# Patient Record
Sex: FEMALE | Race: WHITE | NOT HISPANIC OR LATINO
[De-identification: names, ages, dates, MRNs, and addresses within clinical notes are randomized per-mention and may not be internally consistent; named-entity substitution may affect disease eponyms.]

---

## 2017-01-25 ENCOUNTER — RESULT REVIEW (OUTPATIENT)
Age: 27
End: 2017-01-25

## 2017-10-04 ENCOUNTER — OUTPATIENT (OUTPATIENT)
Dept: OUTPATIENT SERVICES | Facility: HOSPITAL | Age: 27
LOS: 1 days | End: 2017-10-04
Payer: COMMERCIAL

## 2017-10-04 ENCOUNTER — APPOINTMENT (OUTPATIENT)
Age: 27
End: 2017-10-04
Payer: COMMERCIAL

## 2017-10-04 DIAGNOSIS — Z00.8 ENCOUNTER FOR OTHER GENERAL EXAMINATION: ICD-10-CM

## 2017-10-04 PROCEDURE — 73650 X-RAY EXAM OF HEEL: CPT | Mod: 26,50

## 2017-10-04 PROCEDURE — 73650 X-RAY EXAM OF HEEL: CPT

## 2018-01-25 ENCOUNTER — RESULT REVIEW (OUTPATIENT)
Age: 28
End: 2018-01-25

## 2018-12-08 ENCOUNTER — TRANSCRIPTION ENCOUNTER (OUTPATIENT)
Age: 28
End: 2018-12-08

## 2019-08-11 ENCOUNTER — TRANSCRIPTION ENCOUNTER (OUTPATIENT)
Age: 29
End: 2019-08-11

## 2020-12-14 ENCOUNTER — APPOINTMENT (OUTPATIENT)
Dept: CARDIOLOGY | Facility: CLINIC | Age: 30
End: 2020-12-14
Payer: COMMERCIAL

## 2020-12-14 VITALS — HEART RATE: 98 BPM | SYSTOLIC BLOOD PRESSURE: 152 MMHG | OXYGEN SATURATION: 98 % | DIASTOLIC BLOOD PRESSURE: 96 MMHG

## 2020-12-14 DIAGNOSIS — Z86.39 PERSONAL HISTORY OF OTHER ENDOCRINE, NUTRITIONAL AND METABOLIC DISEASE: ICD-10-CM

## 2020-12-14 DIAGNOSIS — Z87.09 PERSONAL HISTORY OF OTHER DISEASES OF THE RESPIRATORY SYSTEM: ICD-10-CM

## 2020-12-14 DIAGNOSIS — Z87.19 PERSONAL HISTORY OF OTHER DISEASES OF THE DIGESTIVE SYSTEM: ICD-10-CM

## 2020-12-14 PROCEDURE — 99072 ADDL SUPL MATRL&STAF TM PHE: CPT

## 2020-12-14 PROCEDURE — 93000 ELECTROCARDIOGRAM COMPLETE: CPT

## 2020-12-14 PROCEDURE — 99204 OFFICE O/P NEW MOD 45 MIN: CPT

## 2020-12-14 RX ORDER — FEXOFENADINE HYDROCHLORIDE 180 MG/1
180 TABLET, FILM COATED ORAL DAILY
Qty: 1 | Refills: 1 | Status: ACTIVE | COMMUNITY

## 2020-12-14 RX ORDER — NORETHINDRONE ACETATE AND ETHINYL ESTRADIOL AND FERROUS FUMARATE 1.5-30(21)
1.5-3 KIT ORAL DAILY
Refills: 0 | Status: ACTIVE | COMMUNITY

## 2020-12-14 NOTE — HISTORY OF PRESENT ILLNESS
[FreeTextEntry1] : 29 yo female presents concerned about her cardiac status. Pt reports occasional dyspnea with exertion. Pt reports chest discomfort which she self diagnosed as "indigestion" which occurs for days and sometimes is relieved with antacid. Pt has strong family history of CAD, including her 47 yo sister and her 42 yo brother, both with history of MI and PCI.  Her father and Uncles also had PCI

## 2020-12-14 NOTE — PHYSICAL EXAM
[General Appearance - Well Developed] : well developed [Normal Appearance] : normal appearance [Well Groomed] : well groomed [General Appearance - Well Nourished] : well nourished [No Deformities] : no deformities [General Appearance - In No Acute Distress] : no acute distress [Normal Conjunctiva] : the conjunctiva exhibited no abnormalities [Eyelids - No Xanthelasma] : the eyelids demonstrated no xanthelasmas [Normal Oral Mucosa] : normal oral mucosa [No Oral Pallor] : no oral pallor [No Oral Cyanosis] : no oral cyanosis [Normal Jugular Venous A Waves Present] : normal jugular venous A waves present [Normal Jugular Venous V Waves Present] : normal jugular venous V waves present [No Jugular Venous Forman A Waves] : no jugular venous forman A waves [Heart Rate And Rhythm] : heart rate and rhythm were normal [Heart Sounds] : normal S1 and S2 [Murmurs] : no murmurs present [Respiration, Rhythm And Depth] : normal respiratory rhythm and effort [Exaggerated Use Of Accessory Muscles For Inspiration] : no accessory muscle use [Auscultation Breath Sounds / Voice Sounds] : lungs were clear to auscultation bilaterally [Abdomen Soft] : soft [Abdomen Tenderness] : non-tender [Abdomen Mass (___ Cm)] : no abdominal mass palpated [Abnormal Walk] : normal gait [Gait - Sufficient For Exercise Testing] : the gait was sufficient for exercise testing [Nail Clubbing] : no clubbing of the fingernails [Cyanosis, Localized] : no localized cyanosis [Petechial Hemorrhages (___cm)] : no petechial hemorrhages [Skin Color & Pigmentation] : normal skin color and pigmentation [] : no rash [No Venous Stasis] : no venous stasis [Skin Lesions] : no skin lesions [No Skin Ulcers] : no skin ulcer [No Xanthoma] : no  xanthoma was observed [Oriented To Time, Place, And Person] : oriented to person, place, and time [Affect] : the affect was normal [Mood] : the mood was normal [No Anxiety] : not feeling anxious

## 2020-12-14 NOTE — DISCUSSION/SUMMARY
[FreeTextEntry1] : Pt will have an ETT and Echo to understand risk and in preparation for an exercise program. Labs will be ordered.

## 2020-12-28 ENCOUNTER — APPOINTMENT (OUTPATIENT)
Dept: CARDIOLOGY | Facility: CLINIC | Age: 30
End: 2020-12-28
Payer: COMMERCIAL

## 2020-12-28 LAB
25(OH)D3 SERPL-MCNC: 21.1 NG/ML
ALBUMIN SERPL ELPH-MCNC: 4.7 G/DL
ALP BLD-CCNC: 69 U/L
ALT SERPL-CCNC: 29 U/L
ANION GAP SERPL CALC-SCNC: 17 MMOL/L
AST SERPL-CCNC: 20 U/L
BASOPHILS # BLD AUTO: 0.06 K/UL
BASOPHILS NFR BLD AUTO: 0.5 %
BILIRUB SERPL-MCNC: 0.5 MG/DL
BUN SERPL-MCNC: 12 MG/DL
CALCIUM SERPL-MCNC: 9.7 MG/DL
CHLORIDE SERPL-SCNC: 103 MMOL/L
CHOLEST SERPL-MCNC: 226 MG/DL
CO2 SERPL-SCNC: 18 MMOL/L
CREAT SERPL-MCNC: 1.02 MG/DL
EOSINOPHIL # BLD AUTO: 0.5 K/UL
EOSINOPHIL NFR BLD AUTO: 4.5 %
GLUCOSE SERPL-MCNC: 119 MG/DL
HCT VFR BLD CALC: 48.6 %
HDLC SERPL-MCNC: 46 MG/DL
HGB BLD-MCNC: 14.5 G/DL
IMM GRANULOCYTES NFR BLD AUTO: 0.5 %
LDLC SERPL CALC-MCNC: 115 MG/DL
LYMPHOCYTES # BLD AUTO: 3.94 K/UL
LYMPHOCYTES NFR BLD AUTO: 35.3 %
MAN DIFF?: NORMAL
MCHC RBC-ENTMCNC: 21 PG
MCHC RBC-ENTMCNC: 29.8 GM/DL
MCV RBC AUTO: 70.3 FL
MONOCYTES # BLD AUTO: 0.55 K/UL
MONOCYTES NFR BLD AUTO: 4.9 %
NEUTROPHILS # BLD AUTO: 6.05 K/UL
NEUTROPHILS NFR BLD AUTO: 54.3 %
NONHDLC SERPL-MCNC: 180 MG/DL
PLATELET # BLD AUTO: 399 K/UL
POTASSIUM SERPL-SCNC: 4.2 MMOL/L
PROT SERPL-MCNC: 7.3 G/DL
RBC # BLD: 6.91 M/UL
RBC # FLD: 16.5 %
SODIUM SERPL-SCNC: 138 MMOL/L
T3FREE SERPL-MCNC: 3.28 PG/ML
T4 FREE SERPL-MCNC: 1.1 NG/DL
TRIGL SERPL-MCNC: 327 MG/DL
TSH SERPL-ACNC: 2.37 UIU/ML
WBC # FLD AUTO: 11.16 K/UL

## 2020-12-28 PROCEDURE — 99072 ADDL SUPL MATRL&STAF TM PHE: CPT

## 2020-12-28 PROCEDURE — 93015 CV STRESS TEST SUPVJ I&R: CPT

## 2020-12-31 ENCOUNTER — APPOINTMENT (OUTPATIENT)
Dept: CARDIOLOGY | Facility: CLINIC | Age: 30
End: 2020-12-31
Payer: COMMERCIAL

## 2020-12-31 LAB
ESTIMATED AVERAGE GLUCOSE: 126 MG/DL
HBA1C MFR BLD HPLC: 6 %
SARS-COV-2 IGG SERPL IA-ACNC: <0.1 INDEX
SARS-COV-2 IGG SERPL QL IA: NEGATIVE

## 2020-12-31 PROCEDURE — 99072 ADDL SUPL MATRL&STAF TM PHE: CPT

## 2020-12-31 PROCEDURE — 93306 TTE W/DOPPLER COMPLETE: CPT

## 2021-01-04 ENCOUNTER — APPOINTMENT (OUTPATIENT)
Dept: CARDIOLOGY | Facility: CLINIC | Age: 31
End: 2021-01-04
Payer: COMMERCIAL

## 2021-01-04 VITALS
WEIGHT: 240 LBS | BODY MASS INDEX: 38.57 KG/M2 | OXYGEN SATURATION: 97 % | SYSTOLIC BLOOD PRESSURE: 145 MMHG | HEART RATE: 83 BPM | HEIGHT: 66 IN | DIASTOLIC BLOOD PRESSURE: 93 MMHG

## 2021-01-04 PROCEDURE — 99072 ADDL SUPL MATRL&STAF TM PHE: CPT

## 2021-01-04 PROCEDURE — 99214 OFFICE O/P EST MOD 30 MIN: CPT

## 2021-01-04 NOTE — PHYSICAL EXAM
[General Appearance - Well Developed] : well developed [Normal Appearance] : normal appearance [Well Groomed] : well groomed [General Appearance - Well Nourished] : well nourished [No Deformities] : no deformities [General Appearance - In No Acute Distress] : no acute distress [Normal Conjunctiva] : the conjunctiva exhibited no abnormalities [Eyelids - No Xanthelasma] : the eyelids demonstrated no xanthelasmas [Normal Oral Mucosa] : normal oral mucosa [No Oral Pallor] : no oral pallor [No Oral Cyanosis] : no oral cyanosis [Normal Jugular Venous A Waves Present] : normal jugular venous A waves present [Normal Jugular Venous V Waves Present] : normal jugular venous V waves present [No Jugular Venous Forman A Waves] : no jugular venous forman A waves [Respiration, Rhythm And Depth] : normal respiratory rhythm and effort [Exaggerated Use Of Accessory Muscles For Inspiration] : no accessory muscle use [Auscultation Breath Sounds / Voice Sounds] : lungs were clear to auscultation bilaterally [Heart Rate And Rhythm] : heart rate and rhythm were normal [Heart Sounds] : normal S1 and S2 [Murmurs] : no murmurs present [Abdomen Soft] : soft [Abdomen Tenderness] : non-tender [Abdomen Mass (___ Cm)] : no abdominal mass palpated [Abnormal Walk] : normal gait [Gait - Sufficient For Exercise Testing] : the gait was sufficient for exercise testing [Nail Clubbing] : no clubbing of the fingernails [Cyanosis, Localized] : no localized cyanosis [Petechial Hemorrhages (___cm)] : no petechial hemorrhages [Skin Color & Pigmentation] : normal skin color and pigmentation [] : no rash [No Venous Stasis] : no venous stasis [Skin Lesions] : no skin lesions [No Skin Ulcers] : no skin ulcer [No Xanthoma] : no  xanthoma was observed [Oriented To Time, Place, And Person] : oriented to person, place, and time [Affect] : the affect was normal [Mood] : the mood was normal [No Anxiety] : not feeling anxious

## 2021-01-04 NOTE — DISCUSSION/SUMMARY
[FreeTextEntry1] : ETT and Echo were reviewed. Labs were reviewed. Pt was instructed to eat a low carb diet and follow up with her PMD. Pt was encouraged to lose weight and exercise. Pt will be given the contact information for Norma Becker RD. Pt will follow up in 6 mos.

## 2021-01-04 NOTE — HISTORY OF PRESENT ILLNESS
[FreeTextEntry1] : 31 yo female presents concerned about her cardiac status. Pt reports occasional dyspnea with exertion. Pt reports chest discomfort which she self diagnosed as "indigestion" which occurs for days and sometimes is relieved with antacid. Pt has strong family history of CAD, including her 45 yo sister and her 44 yo brother, both with history of MI and PCI.  Her father and Uncles also had PCI

## 2021-01-04 NOTE — REASON FOR VISIT
[Initial Evaluation] : an initial evaluation of [Chest Pain] : chest pain [Dyspnea] : dyspnea [Hyperlipidemia] : hyperlipidemia [FreeTextEntry1] : glucose intolerance

## 2021-01-06 ENCOUNTER — RESULT REVIEW (OUTPATIENT)
Age: 31
End: 2021-01-06

## 2022-03-03 ENCOUNTER — EMERGENCY (EMERGENCY)
Facility: HOSPITAL | Age: 32
LOS: 1 days | Discharge: HOME | End: 2022-03-03
Attending: EMERGENCY MEDICINE | Admitting: EMERGENCY MEDICINE
Payer: COMMERCIAL

## 2022-03-03 VITALS
RESPIRATION RATE: 100 BRPM | DIASTOLIC BLOOD PRESSURE: 94 MMHG | OXYGEN SATURATION: 100 % | WEIGHT: 184.97 LBS | HEART RATE: 135 BPM | TEMPERATURE: 98 F | SYSTOLIC BLOOD PRESSURE: 140 MMHG | HEIGHT: 66 IN

## 2022-03-03 VITALS
HEART RATE: 100 BPM | SYSTOLIC BLOOD PRESSURE: 130 MMHG | DIASTOLIC BLOOD PRESSURE: 80 MMHG | OXYGEN SATURATION: 99 % | RESPIRATION RATE: 18 BRPM

## 2022-03-03 LAB
ALBUMIN SERPL ELPH-MCNC: 3.4 G/DL — SIGNIFICANT CHANGE UP (ref 3.3–5)
ALP SERPL-CCNC: 62 U/L — SIGNIFICANT CHANGE UP (ref 30–120)
ALT FLD-CCNC: 47 U/L DA — SIGNIFICANT CHANGE UP (ref 10–60)
ANION GAP SERPL CALC-SCNC: 12 MMOL/L — SIGNIFICANT CHANGE UP (ref 5–17)
APTT BLD: 27.9 SEC — SIGNIFICANT CHANGE UP (ref 27.5–35.5)
AST SERPL-CCNC: 22 U/L — SIGNIFICANT CHANGE UP (ref 10–40)
BASOPHILS # BLD AUTO: 0.1 K/UL — SIGNIFICANT CHANGE UP (ref 0–0.2)
BASOPHILS NFR BLD AUTO: 0.6 % — SIGNIFICANT CHANGE UP (ref 0–2)
BILIRUB SERPL-MCNC: 0.2 MG/DL — SIGNIFICANT CHANGE UP (ref 0.2–1.2)
BLD GP AB SCN SERPL QL: SIGNIFICANT CHANGE UP
BUN SERPL-MCNC: 13 MG/DL — SIGNIFICANT CHANGE UP (ref 7–23)
CALCIUM SERPL-MCNC: 8.1 MG/DL — LOW (ref 8.4–10.5)
CHLORIDE SERPL-SCNC: 103 MMOL/L — SIGNIFICANT CHANGE UP (ref 96–108)
CO2 SERPL-SCNC: 22 MMOL/L — SIGNIFICANT CHANGE UP (ref 22–31)
CREAT SERPL-MCNC: 1.02 MG/DL — SIGNIFICANT CHANGE UP (ref 0.5–1.3)
EGFR: 75 ML/MIN/1.73M2 — SIGNIFICANT CHANGE UP
EOSINOPHIL # BLD AUTO: 0.13 K/UL — SIGNIFICANT CHANGE UP (ref 0–0.5)
EOSINOPHIL NFR BLD AUTO: 0.7 % — SIGNIFICANT CHANGE UP (ref 0–6)
GLUCOSE SERPL-MCNC: 151 MG/DL — HIGH (ref 70–99)
HCG SERPL-ACNC: <1 MIU/ML — SIGNIFICANT CHANGE UP
HCT VFR BLD CALC: 37.2 % — SIGNIFICANT CHANGE UP (ref 34.5–45)
HGB BLD-MCNC: 11.3 G/DL — LOW (ref 11.5–15.5)
IMM GRANULOCYTES NFR BLD AUTO: 0.8 % — SIGNIFICANT CHANGE UP (ref 0–1.5)
INR BLD: 1.07 RATIO — SIGNIFICANT CHANGE UP (ref 0.88–1.16)
LIDOCAIN IGE QN: 105 U/L — SIGNIFICANT CHANGE UP (ref 73–393)
LYMPHOCYTES # BLD AUTO: 27.5 % — SIGNIFICANT CHANGE UP (ref 13–44)
LYMPHOCYTES # BLD AUTO: 4.91 K/UL — HIGH (ref 1–3.3)
MCHC RBC-ENTMCNC: 21 PG — LOW (ref 27–34)
MCHC RBC-ENTMCNC: 30.4 GM/DL — LOW (ref 32–36)
MCV RBC AUTO: 69.3 FL — LOW (ref 80–100)
MONOCYTES # BLD AUTO: 1.09 K/UL — HIGH (ref 0–0.9)
MONOCYTES NFR BLD AUTO: 6.1 % — SIGNIFICANT CHANGE UP (ref 2–14)
NEUTROPHILS # BLD AUTO: 11.48 K/UL — HIGH (ref 1.8–7.4)
NEUTROPHILS NFR BLD AUTO: 64.3 % — SIGNIFICANT CHANGE UP (ref 43–77)
NRBC # BLD: 0 /100 WBCS — SIGNIFICANT CHANGE UP (ref 0–0)
PLATELET # BLD AUTO: 466 K/UL — HIGH (ref 150–400)
POTASSIUM SERPL-MCNC: 3.8 MMOL/L — SIGNIFICANT CHANGE UP (ref 3.5–5.3)
POTASSIUM SERPL-SCNC: 3.8 MMOL/L — SIGNIFICANT CHANGE UP (ref 3.5–5.3)
PROT SERPL-MCNC: 7.2 G/DL — SIGNIFICANT CHANGE UP (ref 6–8.3)
PROTHROM AB SERPL-ACNC: 12.3 SEC — SIGNIFICANT CHANGE UP (ref 10.5–13.4)
RBC # BLD: 5.37 M/UL — HIGH (ref 3.8–5.2)
RBC # FLD: 14.7 % — HIGH (ref 10.3–14.5)
SODIUM SERPL-SCNC: 137 MMOL/L — SIGNIFICANT CHANGE UP (ref 135–145)
WBC # BLD: 17.85 K/UL — HIGH (ref 3.8–10.5)
WBC # FLD AUTO: 17.85 K/UL — HIGH (ref 3.8–10.5)

## 2022-03-03 PROCEDURE — 86850 RBC ANTIBODY SCREEN: CPT

## 2022-03-03 PROCEDURE — 99283 EMERGENCY DEPT VISIT LOW MDM: CPT

## 2022-03-03 PROCEDURE — 85025 COMPLETE CBC W/AUTO DIFF WBC: CPT

## 2022-03-03 PROCEDURE — 86901 BLOOD TYPING SEROLOGIC RH(D): CPT

## 2022-03-03 PROCEDURE — 83690 ASSAY OF LIPASE: CPT

## 2022-03-03 PROCEDURE — 84702 CHORIONIC GONADOTROPIN TEST: CPT

## 2022-03-03 PROCEDURE — 36415 COLL VENOUS BLD VENIPUNCTURE: CPT

## 2022-03-03 PROCEDURE — 99284 EMERGENCY DEPT VISIT MOD MDM: CPT

## 2022-03-03 PROCEDURE — 80053 COMPREHEN METABOLIC PANEL: CPT

## 2022-03-03 PROCEDURE — 86900 BLOOD TYPING SEROLOGIC ABO: CPT

## 2022-03-03 PROCEDURE — 85730 THROMBOPLASTIN TIME PARTIAL: CPT

## 2022-03-03 PROCEDURE — 85610 PROTHROMBIN TIME: CPT

## 2022-03-03 RX ORDER — SODIUM CHLORIDE 9 MG/ML
1000 INJECTION INTRAMUSCULAR; INTRAVENOUS; SUBCUTANEOUS ONCE
Refills: 0 | Status: COMPLETED | OUTPATIENT
Start: 2022-03-03 | End: 2022-03-03

## 2022-03-03 RX ADMIN — SODIUM CHLORIDE 1000 MILLILITER(S): 9 INJECTION INTRAMUSCULAR; INTRAVENOUS; SUBCUTANEOUS at 22:20

## 2022-03-03 RX ADMIN — SODIUM CHLORIDE 1000 MILLILITER(S): 9 INJECTION INTRAMUSCULAR; INTRAVENOUS; SUBCUTANEOUS at 21:05

## 2022-03-03 NOTE — ED ADULT TRIAGE NOTE - ISOLATION TYPE:
Patient:   AGNES GLYNN            MRN: [id number]            FIN: [id number]               Age:   39 years     Sex:  Female     :  [birth year]   Associated Diagnoses:   None   Author:   Moy Montez MD, Libyb Fofana      Basic Information   Additional information: Chief Complaint from Nursing Triage Note : Chief Complaint   11/15/17 03:45           Chief Complaint           fall, hit head, anxiety  .      History of Present Illness     Patient is a 40 yo F who presents with fall and anxiety. Patient states she has been extremely anxious today. She had a panic attack earlier in the day. Was talking with her boyfriend this evening and had another panic attack. Describes hyperventilating. She then had a syncopal event. Her boyfriend states she slumped over onto the floor. Her LOC was momentary, lasting seconds. She quickly returned to her mental status baseline. She denies CP or SOB. Her panic attacks are consistent with her prior panic attacks. She complains of a mild headache after the fall to the back of the head where she hit her head. Denies any neck pain, paresthesias, weakness. Has been ambulatory per baseline. Denies any vision changes, facial drooping, slurred speech or trouble with word finding, trouble swallowing, numbness or tingling, weakness to the extremities, trouble walking or gait instability.       Review of Systems     General: No fever.  Skin: No rash.  Eyes: No vision problems.  ENT: No sore throat.  Neck: No neck stiffness.  Respiratory: No shortness of breath.  Cardiac: No chest pain.  Gastrointestinal: No nausea, vomiting or abdominal pain.  Urinary: No dysuria.  Musculoskeletal: No myalgias/arthralgias.  Neurologic: + headache, LOC         Past Medical/ Family/ Social History     Medical History:   Anxiety  Depression    Surgical History:  Cholecystectomy    Family Hisory:  No history of early cardiac disease    Social History:  Illicit drugs: denies  Tobacco: regularly  Alcohol:  denies  Lives with boyfriend, present at bedside  Unemployed         Physical Examination               Vital Signs   Vital Signs   11/15/17 04:00           Temperature Oral          98.1 F                             Peripheral Pulse Rate     70 bpm                             Respiratory Rate          30 br/min  HI                             Systolic Blood Pressure   108 mmHg                             Diastolic Blood Pressure  58 mmHg  LOW                             Mean Arterial Pressure    75 mmHg                             Oxygen Saturation         100 %    11/15/17 03:45           Peripheral Pulse Rate     65 bpm                             Respiratory Rate          16 br/min                             Oxygen Saturation         100 %  .     General Appearance: No acute distress, appears comfortable but anxious  Skin: No rash  HEENT: Normocephalic/atraumatic, sclera anicteric, mucous membranes moist  Neck: Normal range of motion  Chest and Lungs: Bilateral breath sounds, clear to auscultation, initially hyperventilating with rr in the 30s, but this quickly resolved  Cardiovascular: Regular rate and rhythm, no murmur  Abdomen: Soft, non-tender  Back: Normal  Musculoskeletal: No edema or tenderness  Neurologic: Awake, alert, oriented, vision grossly intact, normal visual fields to confrontation, EOMI, normal facial expressions with no obvious facial droop, no slurred speech or word finding difficulties, no tongue deviation, 5/5 strength to all 4 extremities, normal SLT grossly to the extremities, no extremities drif t with straight leg raise and outstretched arms, normal gait, no confusion  Psychiatric: Appropriate, cooperative    Pulse Oximetry Interpretation: 100% on room air which is normal.       Medical Decision Making     Cardiac Monitor Interpretation  Medical indication: To monitor for arrhythmia that may develop during the ED course of stay.   Cardiac monitor: Normal sinus rhythm, no ST changes, no  ectopy.    EKG Interpretation  Medical indication: syncope  12 lead EKG, as interpreted by me, shows normal sinus rhythm, incomplete rbbb, rate 70, TWI septal leads (unchanged from prior), no acute ischemic ST or T wave changes, normal intervals. Comparison to prior EKG: Unchanged.     Chest X-Ray Interpretation  Interpretation: One view portable chest x-ray film interpreted by me reveal no infiltrate, no pulm vasc congestion, no pleural effusion, no fractures.  Performed by: Libby Montez MD        Results review:  Lab results : Lab View   11/15/17 02:20           Glucose Lvl               102 mg/dL  HI                             BUN                       8 mg/dL                             Creatinine                0.83 mg/dL                             eGFR AfrAmer              >60 mL/min/1.73m2  NA                             eGFR NonAfrAmer           >60 mL/min/1.73m2  NA                             Sodium                    138 mEq/L                             Potassium                 3.3 mEq/L  LOW                             Chloride                  107 mEq/L                             TCO2                      23 mEq/L                             AGAP                      11 mEq/L                             Calcium                   8.4 mg/dL                             Magnesium Level           1.9 mg/dL                             Troponin I                <0.01 ng/mL                             WBC                       5.7 K/cumm                             RBC                       4.08 M/cumm                             Hgb                       13.0 g/dL                             Hct                       37 %                             MCV                       91 FL                             MCH                       32 pg                             MCHC                      35 g/dL                             RDW                       12.4 %                             Platelet                   145 K/cumm  LOW                             NRBC                      0.0 %                             Abs Neutro                2.2 K/cumm                             Neutrophil                39 %  NA                             Abs Lymph                 3.0 K/cumm                             Lymphocyte                53 %  NA                             Abs Mono                  0.3 K/cumm                             Monocyte                  5 %  NA                             Immature Gran             0.2 %                             Eosinophil                2 %                             Basophil                  1 %  .      Reexamination/ Reevaluation     Patient is a 38 yo F who presents with panic attack that caused her to hyperventilate, thus likely causing her syncopal event. No red flag signs or symptoms. She was anxious on arrival but this quickly resolved. HA improved with tylenol. CT head with no acute process. Labs normal. EKG unchanged from baseline. PERC negative; not likely PE. Not likely acute cardiac event given young age, lack of risk factors, and normal trop, ecg at baseline. She was feeling significantly improved and felt comfortable going home. She was given strict ER return precautions and told to f/u tomorrow with her pcp.  I explained to the patient that an emergency department evaluation is not exhaustive and it is important to follow up with a primary care physician or specialist as discussed, and to return to the emergency department if symptoms are not improving or are worsening. The patient verbalized understanding of these follow up instructions and return precautions.     Libby Montez MD         Impression and Plan   Diagnosis   1. Syncopal event  2. Panic attack   Plan   Condition: Improved.    Disposition: Discharged: to home.    Patient was given the following educational materials: Panic Attacks, Syncope.    Follow up with: Follow up with primary  care provider Within 1 to 2 days Call for follow up appointment with your primary care physician regarding your ER visit today.  Return if symptoms worsen, you develop another episode of passing out, chest pains, trouble breathing, palpitations, sweating, or other new and concerning symptoms for immediate reevaluation..    Counseled: Patient, Regarding diagnosis, Regarding diagnostic results, Regarding treatment plan.         None

## 2022-03-03 NOTE — ED PROVIDER NOTE - PROGRESS NOTE DETAILS
H&H 11.3/37.2  mild tachycardic. +orthostats. will give additional fluids and reassess. continue to monitor. not passing large clots in ED. has follow up with ob scheduled tomorrow resting comfortably. Dr. Burns will assume care resting comfortably. reports feeling better and less bleeding.  Dr. Burns will assume care

## 2022-03-03 NOTE — ED PROVIDER NOTE - CLINICAL SUMMARY MEDICAL DECISION MAKING FREE TEXT BOX
heavy vag bleeding for 2 days. will check labs to eval for H&H. check pregnancy. denies pelvic pain. ob follow up

## 2022-03-03 NOTE — ED PROVIDER NOTE - NSFOLLOWUPINSTRUCTIONS_ED_ALL_ED_FT
Menorrhagia      Menorrhagia is a form of abnormal uterine bleeding in which menstrual periods are heavy or last longer than normal. With menorrhagia, the periods may cause enough blood loss and cramping that a woman becomes unable to take part in her usual activities.      What are the causes?    Common causes of this condition include:  •Polyps or fibroids. These are noncancerous growths in the uterus.      •An imbalance of the hormones estrogen and progesterone.      •Anovulation, which occurs when one of the ovaries does not release an egg during one or more months.      •A problem with the thyroid gland (hypothyroidism).      •Side effects of having an intrauterine device (IUD).      •Side effects of some medicines, such as NSAIDs or blood thinners.      •A bleeding disorder that stops the blood from clotting normally.      In some cases, the cause of this condition is not known.      What increases the risk?    You are more likely to develop this condition if you have cancer of the uterus.      What are the signs or symptoms?    Symptoms of this condition include:  •Routinely having to change your pad or tampon every 1–2 hours because it is soaked.      •Needing to use pads and tampons at the same time because of heavy bleeding.      •Needing to wake up to change your pads or tampons during the night.      •Passing blood clots larger than 1 inch (2.5 cm) in size.      •Having bleeding that lasts for more than 7 days.      •Having symptoms of low iron levels (anemia), such as tiredness (fatigue) or shortness of breath.        How is this diagnosed?    This condition may be diagnosed based on:  •A physical exam.      •Your symptoms and menstrual history.    •Tests, such as:  •Blood tests to check if you are pregnant or if you have hormonal changes, a bleeding or thyroid disorder, anemia, or other problems.      •Pap test to check for cancerous changes, infections, or inflammation.      •Endometrial biopsy. This test involves removing a tissue sample from the lining of the uterus (endometrium) to be examined under a microscope.      •Pelvic ultrasound. This test uses sound waves to create images of your uterus, ovaries, and vagina. The images can show if you have fibroids or other growths.      •Hysteroscopy. For this test, a thin, flexible tube with a light on the end (hysteroscope) is used to look inside your uterus.          How is this treated?    Treatment may not be needed for this condition. If it is needed, the best treatment for you will depend on:  •Whether you need to prevent pregnancy.      •Your desire to have children in the future.      •The cause and severity of your bleeding.      •Your personal preference.        Medicine    Medicines are the first step in treatment. You may be treated with:•Hormonal birth control methods. These treatments reduce bleeding during your menstrual period. They include:  •Birth control pills.      •Skin patch.      •Vaginal ring.      •Shots (injections) that you get every 3 months.      •Hormonal IUD.      •Implants that go under the skin.        •Medicines that thicken the blood and slow bleeding.      •Medicines that reduce swelling, such as ibuprofen.      •Medicines that contain an artificial (synthetic) hormone called progestin.      •Medicines that make the ovaries stop working for a short time.      •Iron supplements to treat anemia.      Surgery    If medicines do not work, surgery may be done. Surgical options may include:  •Dilation and curettage (D&C). In this procedure, your health care provider opens the lowest part of the uterus (cervix) and then scrapes or suctions tissue from the endometrium. This reduces menstrual bleeding.      •Operative hysteroscopy. In this procedure, a hysteroscope is used to view your uterus and help remove polyps that may be causing heavy periods.      •Endometrial ablation. This is when various techniques are used to permanently destroy your entire endometrium. After endometrial ablation, most women have little or no menstrual flow. This procedure reduces your ability to become pregnant.      •Endometrial resection. In this procedure, an electrosurgical wire loop is used to remove the endometrium. This procedure reduces your ability to become pregnant.      •Hysterectomy. This is surgical removal of your uterus. This is a permanent procedure that stops menstrual periods. Pregnancy is not possible after a hysterectomy.        Follow these instructions at home:    Medicines     •Take over-the-counter and prescription medicines only as told by your health care provider. This includes iron pills.      • Do not change or switch medicines without asking your health care provider.      • Do not take aspirin or medicines that contain aspirin 1 week before or during your menstrual period. Aspirin may make bleeding worse.      Managing constipation     Your iron pills may cause constipation. If you are taking prescription iron supplements, you may need to take these actions to prevent or treat constipation:  •Drink enough fluid to keep your urine pale yellow.      •Take over-the-counter or prescription medicines.      •Eat foods that are high in fiber, such as beans, whole grains, and fresh fruits and vegetables.      •Limit foods that are high in fat and processed sugars, such as fried or sweet foods.      General instructions    •If you need to change your sanitary pad or tampon more than once every 2 hours, limit your activity until the bleeding stops.      •Eat well-balanced meals, including foods that are high in iron. Foods that have a lot of iron include leafy green vegetables, meat, liver, eggs, and whole-grain breads and cereals.      •Do not try to lose weight until the abnormal bleeding has stopped and your blood iron level is back to normal. If you need to lose weight, work with your health care provider to lose weight safely.      •Keep all follow-up visits. This is important.        Contact a health care provider if:    •You soak through a pad or tampon every 1 or 2 hours, and this happens every time you have a period.      •You need to use pads and tampons at the same time because you are bleeding so much.      •You have nausea, vomiting, diarrhea, or other problems related to medicines you are taking.        Get help right away if:    •You soak through more than a pad or tampon in 1 hour.      •You pass clots bigger than 1 inch (2.5 cm) wide.      •You feel short of breath.      •You feel like your heart is beating too fast.      •You feel dizzy or you faint.      •You feel very weak or tired.        Summary    •Menorrhagia is a form of abnormal uterine bleeding in which menstrual periods are heavy or last longer than normal.      •Treatment may not be needed for this condition. If it is needed, it may include medicines or procedures.      •Take over-the-counter and prescription medicines only as told by your health care provider. This includes iron pills.      •Get help right away if you have heavy bleeding that soaks through more than a pad or tampon in 1 hour, you pass large clots, or you feel dizzy, short of breath, or very weak or tired.      This information is not intended to replace advice given to you by your health care provider. Make sure you discuss any questions you have with your health care provider.

## 2022-03-03 NOTE — ED PROVIDER NOTE - PRINCIPAL DIAGNOSIS
Menorrhagia Infliximab Counseling:  I discussed with the patient the risks of infliximab including but not limited to myelosuppression, immunosuppression, autoimmune hepatitis, demyelinating diseases, lymphoma, and serious infections.  The patient understands that monitoring is required including a PPD at baseline and must alert us or the primary physician if symptoms of infection or other concerning signs are noted.

## 2022-03-03 NOTE — ED PROVIDER NOTE - ATTENDING CONTRIBUTION TO CARE
Von Burns MD: I have personally performed a face to face diagnostic evaluation on this patient.  I have reviewed the PA note and agree with the history, exam, and plan of care, except as noted.  History and Exam by me shows same findings as documented    Att note: Patient with heavy vag bleeding. Has gyn appt tomorrow. Noted to be tachycardic and orthostatic. Will rehydrate and check labs

## 2022-03-03 NOTE — ED ADULT TRIAGE NOTE - CHIEF COMPLAINT QUOTE
vaginal bleeding, progressively worse today. dizziness, lightheaded, intermittent nausea no vomiting.

## 2022-03-03 NOTE — ED PROVIDER NOTE - PATIENT PORTAL LINK FT
You can access the FollowMyHealth Patient Portal offered by [facility] by registering at the following website: [email or web address]. By joining [employer]’s FollowMyHealth portal, you will also be able to view your health information using other applications (apps) compatible with our system.

## 2022-03-03 NOTE — ED ADULT NURSE NOTE - OBJECTIVE STATEMENT
Pt Pt presents with heavy vaginal bleeding since yesterday, intermittent in the past, was placed on BCP, was working for a while, but recently changed BCP brand, had noticed increase in bleeding for the   resents with heavy vag bleeding since yesterday. on and off heavy bleeding in the past. was put on BCP's to help control the bleeding. states they were working for while, but recently changed BCP brand. has noticed increased bleeding over past several months. worse since yesterday and passing clots. was originally going through a pad every few hours but seems like almost a pad an hour past few hours. started to feel weak and dizzy at home PTA which prompted her to come to ED. denies chance of pregnancy. no abd pain. has appt with ob-gyn tomorrow Pt presents with heavy vaginal bleeding since yesterday, intermittent in the past, was placed on BCP, was working for a while, but recently changed BCP brand, had noticed increase in bleeding for the past few months. worse yesterday with clots. started feeling weak and dizzy, which prompted her to come to the ED.  has appt with ob-gyn tomorrow

## 2022-03-03 NOTE — ED PROVIDER NOTE - OBJECTIVE STATEMENT
31 year old female presents with heavy vag bleeding since yesterday. on and off heavy bleeding in the past. was put on BCP's to help control the bleeding. states they were working for while, but recently changed BCP brand. has noticed increased bleeding over past several months. worse since yesterday and passing clots. was originally going through a pad every few hours but seems like almost a pad an hour past few hours. started to feel weak and dizzy at home PTA which prompted her to come to ED. denies chance of pregnancy. no abd pain. has appt with ob-gyn tomorrow   ob [facility]

## 2022-11-11 ENCOUNTER — APPOINTMENT (OUTPATIENT)
Dept: OBGYN | Facility: CLINIC | Age: 32
End: 2022-11-11

## 2022-11-11 VITALS
WEIGHT: 240 LBS | HEIGHT: 66 IN | DIASTOLIC BLOOD PRESSURE: 80 MMHG | BODY MASS INDEX: 38.57 KG/M2 | SYSTOLIC BLOOD PRESSURE: 140 MMHG

## 2022-11-11 DIAGNOSIS — N92.0 EXCESSIVE AND FREQUENT MENSTRUATION WITH REGULAR CYCLE: ICD-10-CM

## 2022-11-11 PROCEDURE — 99215 OFFICE O/P EST HI 40 MIN: CPT

## 2022-11-11 NOTE — PLAN
[FreeTextEntry1] : PT PRESENTS FOR CONSULT REGARDING UTERINE FIBROIDS. FINDINGS ON IMAGING STUDIES REVIEWED. ILLUSTRATION OF THE MYOMATA AND LOCATION GIVEN. TREATMENT OPTIONS DISCUSSED INCLUDING NON SURGERY AND SURGICAL OPTIONS. MYOMECTOMY DISCUSSED. ALL QUESTIONS ASKED AND ANSWERED. APPROACH TO  MYOMA DISCUSSED. INFORMATION GIVEN. ADVISED ON MEDS FOR BLEEDING. RX SENT

## 2022-11-11 NOTE — HISTORY OF PRESENT ILLNESS
[FreeTextEntry1] : 33 YO  WITH MENORRHAGIA  AND A SUBMUCOUS MYOMA FOR SURGICAL CONSULTATION. DISCUSSED PATIENTS PROLONGED BLEEDING AND EFFORTS TO MINIMIZE IT. WE REVIEWED THE SONOGRAM WHICH REVEALS A  >4CM SUBMUCOUS MYOMA. DISCUSSED TREATMENT OPTIONS

## 2022-11-11 NOTE — PHYSICAL EXAM
[Chaperone Present] : A chaperone was present in the examining room during all aspects of the physical examination [FreeTextEntry1] : LEIF MED STUDENT

## 2022-11-11 NOTE — COUNSELING
[Contraception/ Emergency Contraception/ Safe Sexual Practices] : contraception, emergency contraception, safe sexual practices [Preconception Care/ Fertility options] : preconception care, fertility options [Pre/Post Op Instructions] : pre/post op instructions

## 2023-01-23 DIAGNOSIS — Z20.822 CONTACT WITH AND (SUSPECTED) EXPOSURE TO COVID-19: ICD-10-CM

## 2023-02-10 ENCOUNTER — OUTPATIENT (OUTPATIENT)
Dept: OUTPATIENT SERVICES | Facility: HOSPITAL | Age: 33
LOS: 1 days | End: 2023-02-10
Payer: COMMERCIAL

## 2023-02-10 VITALS
HEIGHT: 66.5 IN | SYSTOLIC BLOOD PRESSURE: 144 MMHG | TEMPERATURE: 98 F | OXYGEN SATURATION: 100 % | RESPIRATION RATE: 18 BRPM | HEART RATE: 99 BPM | DIASTOLIC BLOOD PRESSURE: 80 MMHG | WEIGHT: 259.04 LBS

## 2023-02-10 DIAGNOSIS — N92.0 EXCESSIVE AND FREQUENT MENSTRUATION WITH REGULAR CYCLE: ICD-10-CM

## 2023-02-10 DIAGNOSIS — Z01.818 ENCOUNTER FOR OTHER PREPROCEDURAL EXAMINATION: ICD-10-CM

## 2023-02-10 DIAGNOSIS — Z98.890 OTHER SPECIFIED POSTPROCEDURAL STATES: Chronic | ICD-10-CM

## 2023-02-10 LAB
ADD ON TEST-SPECIMEN IN LAB: SIGNIFICANT CHANGE UP
ANION GAP SERPL CALC-SCNC: 7 MMOL/L — SIGNIFICANT CHANGE UP (ref 5–17)
ANISOCYTOSIS BLD QL: SLIGHT — SIGNIFICANT CHANGE UP
APPEARANCE UR: CLEAR — SIGNIFICANT CHANGE UP
BASOPHILS # BLD AUTO: 0.06 K/UL — SIGNIFICANT CHANGE UP (ref 0–0.2)
BASOPHILS NFR BLD AUTO: 0.6 % — SIGNIFICANT CHANGE UP (ref 0–2)
BILIRUB UR-MCNC: NEGATIVE — SIGNIFICANT CHANGE UP
BLD GP AB SCN SERPL QL: SIGNIFICANT CHANGE UP
BUN SERPL-MCNC: 11 MG/DL — SIGNIFICANT CHANGE UP (ref 7–23)
CALCIUM SERPL-MCNC: 9.4 MG/DL — SIGNIFICANT CHANGE UP (ref 8.5–10.1)
CHLORIDE SERPL-SCNC: 108 MMOL/L — SIGNIFICANT CHANGE UP (ref 96–108)
CO2 SERPL-SCNC: 22 MMOL/L — SIGNIFICANT CHANGE UP (ref 22–31)
COLOR SPEC: YELLOW — SIGNIFICANT CHANGE UP
CREAT SERPL-MCNC: 0.85 MG/DL — SIGNIFICANT CHANGE UP (ref 0.5–1.3)
DACRYOCYTES BLD QL SMEAR: SLIGHT — SIGNIFICANT CHANGE UP
DIFF PNL FLD: NEGATIVE — SIGNIFICANT CHANGE UP
EGFR: 93 ML/MIN/1.73M2 — SIGNIFICANT CHANGE UP
EOSINOPHIL # BLD AUTO: 0.34 K/UL — SIGNIFICANT CHANGE UP (ref 0–0.5)
EOSINOPHIL NFR BLD AUTO: 3.3 % — SIGNIFICANT CHANGE UP (ref 0–6)
GLUCOSE SERPL-MCNC: 130 MG/DL — HIGH (ref 70–99)
GLUCOSE UR QL: NEGATIVE — SIGNIFICANT CHANGE UP
HCT VFR BLD CALC: 39.9 % — SIGNIFICANT CHANGE UP (ref 34.5–45)
HGB BLD-MCNC: 11.5 G/DL — SIGNIFICANT CHANGE UP (ref 11.5–15.5)
HYPOCHROMIA BLD QL: SLIGHT — SIGNIFICANT CHANGE UP
IMM GRANULOCYTES NFR BLD AUTO: 0.6 % — SIGNIFICANT CHANGE UP (ref 0–0.9)
KETONES UR-MCNC: NEGATIVE — SIGNIFICANT CHANGE UP
LEUKOCYTE ESTERASE UR-ACNC: NEGATIVE — SIGNIFICANT CHANGE UP
LYMPHOCYTES # BLD AUTO: 2.44 K/UL — SIGNIFICANT CHANGE UP (ref 1–3.3)
LYMPHOCYTES # BLD AUTO: 24 % — SIGNIFICANT CHANGE UP (ref 13–44)
MANUAL SMEAR VERIFICATION: SIGNIFICANT CHANGE UP
MCHC RBC-ENTMCNC: 17.9 PG — LOW (ref 27–34)
MCHC RBC-ENTMCNC: 28.8 GM/DL — LOW (ref 32–36)
MCV RBC AUTO: 62 FL — LOW (ref 80–100)
MICROCYTES BLD QL: SLIGHT — SIGNIFICANT CHANGE UP
MONOCYTES # BLD AUTO: 0.58 K/UL — SIGNIFICANT CHANGE UP (ref 0–0.9)
MONOCYTES NFR BLD AUTO: 5.7 % — SIGNIFICANT CHANGE UP (ref 2–14)
NEUTROPHILS # BLD AUTO: 6.69 K/UL — SIGNIFICANT CHANGE UP (ref 1.8–7.4)
NEUTROPHILS NFR BLD AUTO: 65.8 % — SIGNIFICANT CHANGE UP (ref 43–77)
NITRITE UR-MCNC: NEGATIVE — SIGNIFICANT CHANGE UP
PH UR: 5 — SIGNIFICANT CHANGE UP (ref 5–8)
PLAT MORPH BLD: NORMAL — SIGNIFICANT CHANGE UP
PLATELET # BLD AUTO: 450 K/UL — HIGH (ref 150–400)
POIKILOCYTOSIS BLD QL AUTO: SLIGHT — SIGNIFICANT CHANGE UP
POLYCHROMASIA BLD QL SMEAR: SLIGHT — SIGNIFICANT CHANGE UP
POTASSIUM SERPL-MCNC: 3.7 MMOL/L — SIGNIFICANT CHANGE UP (ref 3.5–5.3)
POTASSIUM SERPL-SCNC: 3.7 MMOL/L — SIGNIFICANT CHANGE UP (ref 3.5–5.3)
PROT UR-MCNC: NEGATIVE — SIGNIFICANT CHANGE UP
RBC # BLD: 6.44 M/UL — HIGH (ref 3.8–5.2)
RBC # FLD: 20.5 % — HIGH (ref 10.3–14.5)
RBC BLD AUTO: ABNORMAL
SCHISTOCYTES BLD QL AUTO: SLIGHT — SIGNIFICANT CHANGE UP
SODIUM SERPL-SCNC: 137 MMOL/L — SIGNIFICANT CHANGE UP (ref 135–145)
SP GR SPEC: 1.02 — SIGNIFICANT CHANGE UP (ref 1.01–1.02)
UROBILINOGEN FLD QL: NEGATIVE — SIGNIFICANT CHANGE UP
WBC # BLD: 10.17 K/UL — SIGNIFICANT CHANGE UP (ref 3.8–10.5)
WBC # FLD AUTO: 10.17 K/UL — SIGNIFICANT CHANGE UP (ref 3.8–10.5)

## 2023-02-10 PROCEDURE — 85025 COMPLETE CBC W/AUTO DIFF WBC: CPT

## 2023-02-10 PROCEDURE — 86900 BLOOD TYPING SEROLOGIC ABO: CPT

## 2023-02-10 PROCEDURE — 83036 HEMOGLOBIN GLYCOSYLATED A1C: CPT

## 2023-02-10 PROCEDURE — 86901 BLOOD TYPING SEROLOGIC RH(D): CPT

## 2023-02-10 PROCEDURE — 99214 OFFICE O/P EST MOD 30 MIN: CPT | Mod: 25

## 2023-02-10 PROCEDURE — 93010 ELECTROCARDIOGRAM REPORT: CPT

## 2023-02-10 PROCEDURE — 81003 URINALYSIS AUTO W/O SCOPE: CPT

## 2023-02-10 PROCEDURE — 86850 RBC ANTIBODY SCREEN: CPT

## 2023-02-10 PROCEDURE — 93005 ELECTROCARDIOGRAM TRACING: CPT

## 2023-02-10 PROCEDURE — 80048 BASIC METABOLIC PNL TOTAL CA: CPT

## 2023-02-10 PROCEDURE — 36415 COLL VENOUS BLD VENIPUNCTURE: CPT

## 2023-02-10 RX ORDER — NORETHINDRONE 0.35 MG/1
1 TABLET ORAL
Qty: 0 | Refills: 0 | DISCHARGE

## 2023-02-10 NOTE — H&P PST ADULT - NSICDXPASTMEDICALHX_GEN_ALL_CORE_FT
PAST MEDICAL HISTORY:  Anemia     GERD (gastroesophageal reflux disease)     Obesity     Psoriasis

## 2023-02-10 NOTE — H&P PST ADULT - HISTORY OF PRESENT ILLNESS
32 years old female with submucous leiomyoma of uterus, menorrhagia. Patient went to ER with heavy vaginal bleeding with some abdominal cramping 3/ 2022. Diagnosed with fibroid. She had a D&C done 3/2022. 10/ 2022 she had vaginal bleeding "for 35 years". She was prescribed iron tablets due to anemia. MRI with and without contrast 11/ 2022.  She was referred to Dr. Castro. Placed on Norethindrone. Denies vaginal bleed since medication.  Planned robotic myomectomy

## 2023-02-10 NOTE — H&P PST ADULT - NSICDXFAMILYHX_GEN_ALL_CORE_FT
FAMILY HISTORY:  Father  Still living? Yes, Estimated age: 71-80  Family history of heart disease, Age at diagnosis: Age Unknown    Mother  Still living? Yes, Estimated age: 71-80  Family history of anxiety disorder, Age at diagnosis: Age Unknown    Sibling  Still living? Yes, Estimated age: 41-50  Family history of heart disease, Age at diagnosis: Age Unknown  Family history of heart disease, Age at diagnosis: Age Unknown

## 2023-02-10 NOTE — H&P PST ADULT - ASSESSMENT
32 years old female present to PST prior to robotic myomectomy with Dr. Castro.    Plan   1. NPO as per ASU  2. Take the following medications with sips of water on the day of procedure: Norethindrone  3. Use E-Z sponge as directed  4. Urine pregnancy on the day of surgery  5. Drink a quart of extra  fluids the day before your surgery.  6. Patine aware of the need for a covid swab  7. CBC, BMP, Urinalysis, Type and Screen sent to lab  8. EKG done in PST  9. Instructed to remove all jewelry prior to surgery

## 2023-02-11 DIAGNOSIS — N92.0 EXCESSIVE AND FREQUENT MENSTRUATION WITH REGULAR CYCLE: ICD-10-CM

## 2023-02-11 DIAGNOSIS — Z01.818 ENCOUNTER FOR OTHER PREPROCEDURAL EXAMINATION: ICD-10-CM

## 2023-02-11 LAB
A1C WITH ESTIMATED AVERAGE GLUCOSE RESULT: 6.5 % — HIGH (ref 4–5.6)
ESTIMATED AVERAGE GLUCOSE: 140 MG/DL — HIGH (ref 68–114)

## 2023-02-17 ENCOUNTER — APPOINTMENT (OUTPATIENT)
Dept: OBGYN | Facility: CLINIC | Age: 33
End: 2023-02-17
Payer: COMMERCIAL

## 2023-02-17 VITALS
BODY MASS INDEX: 42.13 KG/M2 | SYSTOLIC BLOOD PRESSURE: 134 MMHG | DIASTOLIC BLOOD PRESSURE: 76 MMHG | TEMPERATURE: 97.7 F | WEIGHT: 261 LBS

## 2023-02-17 DIAGNOSIS — Z01.818 ENCOUNTER FOR OTHER PREPROCEDURAL EXAMINATION: ICD-10-CM

## 2023-02-17 PROCEDURE — 99213 OFFICE O/P EST LOW 20 MIN: CPT

## 2023-02-17 RX ORDER — SODIUM PHOSPHATE, DIBASIC AND SODIUM PHOSPHATE, MONOBASIC, UNSPECIFIED FORM 7; 19 G/118ML; G/118ML
7-19 ENEMA RECTAL
Qty: 2 | Refills: 0 | Status: ACTIVE | COMMUNITY
Start: 2023-02-17 | End: 1900-01-01

## 2023-02-17 RX ORDER — IBUPROFEN 800 MG/1
800 TABLET ORAL
Qty: 20 | Refills: 0 | Status: ACTIVE | COMMUNITY
Start: 2023-02-17 | End: 1900-01-01

## 2023-02-17 NOTE — PHYSICAL EXAM
[Chaperone Declined] : Patient declined chaperone [Appropriately responsive] : appropriately responsive [No Lymphadenopathy] : no lymphadenopathy [FreeTextEntry7] : INCISION SITES SHOWN AND DISCUSSED

## 2023-02-22 ENCOUNTER — OUTPATIENT (OUTPATIENT)
Dept: INPATIENT UNIT | Facility: HOSPITAL | Age: 33
LOS: 1 days | Discharge: HOME | End: 2023-02-22
Payer: COMMERCIAL

## 2023-02-22 ENCOUNTER — TRANSCRIPTION ENCOUNTER (OUTPATIENT)
Age: 33
End: 2023-02-22

## 2023-02-22 ENCOUNTER — RESULT REVIEW (OUTPATIENT)
Age: 33
End: 2023-02-22

## 2023-02-22 ENCOUNTER — APPOINTMENT (OUTPATIENT)
Dept: OBGYN | Facility: HOSPITAL | Age: 33
End: 2023-02-22

## 2023-02-22 VITALS
HEART RATE: 91 BPM | DIASTOLIC BLOOD PRESSURE: 85 MMHG | SYSTOLIC BLOOD PRESSURE: 135 MMHG | RESPIRATION RATE: 16 BRPM | TEMPERATURE: 98 F | OXYGEN SATURATION: 100 %

## 2023-02-22 VITALS
HEART RATE: 92 BPM | HEIGHT: 66 IN | WEIGHT: 259.04 LBS | DIASTOLIC BLOOD PRESSURE: 92 MMHG | SYSTOLIC BLOOD PRESSURE: 166 MMHG | OXYGEN SATURATION: 98 % | TEMPERATURE: 97 F | RESPIRATION RATE: 16 BRPM

## 2023-02-22 DIAGNOSIS — Z98.890 OTHER SPECIFIED POSTPROCEDURAL STATES: Chronic | ICD-10-CM

## 2023-02-22 DIAGNOSIS — N92.0 EXCESSIVE AND FREQUENT MENSTRUATION WITH REGULAR CYCLE: ICD-10-CM

## 2023-02-22 DIAGNOSIS — D25.0 SUBMUCOUS LEIOMYOMA OF UTERUS: ICD-10-CM

## 2023-02-22 LAB
GLUCOSE BLDC GLUCOMTR-MCNC: 123 MG/DL — HIGH (ref 70–99)
HCG UR QL: NEGATIVE — SIGNIFICANT CHANGE UP
HCT VFR BLD CALC: 37.7 % — SIGNIFICANT CHANGE UP (ref 34.5–45)
HGB BLD-MCNC: 11 G/DL — LOW (ref 11.5–15.5)
MCHC RBC-ENTMCNC: 18.4 PG — LOW (ref 27–34)
MCHC RBC-ENTMCNC: 29.2 GM/DL — LOW (ref 32–36)
MCV RBC AUTO: 62.9 FL — LOW (ref 80–100)
PLATELET # BLD AUTO: 402 K/UL — HIGH (ref 150–400)
RBC # BLD: 5.99 M/UL — HIGH (ref 3.8–5.2)
RBC # FLD: 20.8 % — HIGH (ref 10.3–14.5)
WBC # BLD: 19.41 K/UL — HIGH (ref 3.8–10.5)
WBC # FLD AUTO: 19.41 K/UL — HIGH (ref 3.8–10.5)

## 2023-02-22 PROCEDURE — 81025 URINE PREGNANCY TEST: CPT

## 2023-02-22 PROCEDURE — 88305 TISSUE EXAM BY PATHOLOGIST: CPT

## 2023-02-22 PROCEDURE — S2900 ROBOTIC SURGICAL SYSTEM: CPT | Mod: NC

## 2023-02-22 PROCEDURE — 36415 COLL VENOUS BLD VENIPUNCTURE: CPT

## 2023-02-22 PROCEDURE — 85027 COMPLETE CBC AUTOMATED: CPT

## 2023-02-22 PROCEDURE — S2900: CPT

## 2023-02-22 PROCEDURE — C9399: CPT

## 2023-02-22 PROCEDURE — C1765: CPT

## 2023-02-22 PROCEDURE — 82962 GLUCOSE BLOOD TEST: CPT

## 2023-02-22 PROCEDURE — C1889: CPT

## 2023-02-22 PROCEDURE — 58545 LAPAROSCOPIC MYOMECTOMY: CPT

## 2023-02-22 PROCEDURE — 88305 TISSUE EXAM BY PATHOLOGIST: CPT | Mod: 26

## 2023-02-22 PROCEDURE — 58545 LAPAROSCOPIC MYOMECTOMY: CPT | Mod: AS

## 2023-02-22 RX ORDER — IBUPROFEN 200 MG
1 TABLET ORAL
Qty: 20 | Refills: 0
Start: 2023-02-22 | End: 2023-02-26

## 2023-02-22 RX ORDER — ACETAMINOPHEN 500 MG
3 TABLET ORAL
Qty: 60 | Refills: 0
Start: 2023-02-22 | End: 2023-02-26

## 2023-02-22 RX ORDER — HEPARIN SODIUM 5000 [USP'U]/ML
5000 INJECTION INTRAVENOUS; SUBCUTANEOUS ONCE
Refills: 0 | Status: COMPLETED | OUTPATIENT
Start: 2023-02-22 | End: 2023-02-22

## 2023-02-22 RX ORDER — GABAPENTIN 400 MG/1
600 CAPSULE ORAL ONCE
Refills: 0 | Status: COMPLETED | OUTPATIENT
Start: 2023-02-22 | End: 2023-02-22

## 2023-02-22 RX ORDER — OXYCODONE HYDROCHLORIDE 5 MG/1
5 TABLET ORAL ONCE
Refills: 0 | Status: DISCONTINUED | OUTPATIENT
Start: 2023-02-22 | End: 2023-02-22

## 2023-02-22 RX ORDER — ONDANSETRON 8 MG/1
4 TABLET, FILM COATED ORAL ONCE
Refills: 0 | Status: DISCONTINUED | OUTPATIENT
Start: 2023-02-22 | End: 2023-02-22

## 2023-02-22 RX ORDER — FENTANYL CITRATE 50 UG/ML
50 INJECTION INTRAVENOUS
Refills: 0 | Status: DISCONTINUED | OUTPATIENT
Start: 2023-02-22 | End: 2023-02-22

## 2023-02-22 RX ORDER — ACETAMINOPHEN 500 MG
1000 TABLET ORAL ONCE
Refills: 0 | Status: DISCONTINUED | OUTPATIENT
Start: 2023-02-22 | End: 2023-02-22

## 2023-02-22 RX ORDER — ACETAMINOPHEN 500 MG
1000 TABLET ORAL ONCE
Refills: 0 | Status: COMPLETED | OUTPATIENT
Start: 2023-02-22 | End: 2023-02-22

## 2023-02-22 RX ORDER — NORETHINDRONE 0.35 MG/1
1 TABLET ORAL
Qty: 0 | Refills: 0 | DISCHARGE

## 2023-02-22 RX ORDER — SODIUM CHLORIDE 9 MG/ML
1000 INJECTION, SOLUTION INTRAVENOUS
Refills: 0 | Status: DISCONTINUED | OUTPATIENT
Start: 2023-02-22 | End: 2023-02-22

## 2023-02-22 RX ORDER — OXYCODONE HYDROCHLORIDE 5 MG/1
1 TABLET ORAL
Qty: 4 | Refills: 0
Start: 2023-02-22 | End: 2023-02-22

## 2023-02-22 RX ORDER — CEFAZOLIN SODIUM 1 G
2000 VIAL (EA) INJECTION ONCE
Refills: 0 | Status: COMPLETED | OUTPATIENT
Start: 2023-02-22 | End: 2023-02-22

## 2023-02-22 RX ORDER — CELECOXIB 200 MG/1
400 CAPSULE ORAL ONCE
Refills: 0 | Status: COMPLETED | OUTPATIENT
Start: 2023-02-22 | End: 2023-02-22

## 2023-02-22 RX ADMIN — FENTANYL CITRATE 50 MICROGRAM(S): 50 INJECTION INTRAVENOUS at 15:37

## 2023-02-22 RX ADMIN — Medication 1000 MILLIGRAM(S): at 08:19

## 2023-02-22 RX ADMIN — CELECOXIB 400 MILLIGRAM(S): 200 CAPSULE ORAL at 08:26

## 2023-02-22 RX ADMIN — GABAPENTIN 600 MILLIGRAM(S): 400 CAPSULE ORAL at 08:26

## 2023-02-22 RX ADMIN — Medication 1000 MILLIGRAM(S): at 08:25

## 2023-02-22 RX ADMIN — CELECOXIB 400 MILLIGRAM(S): 200 CAPSULE ORAL at 08:20

## 2023-02-22 RX ADMIN — HEPARIN SODIUM 5000 UNIT(S): 5000 INJECTION INTRAVENOUS; SUBCUTANEOUS at 08:19

## 2023-02-22 RX ADMIN — FENTANYL CITRATE 50 MICROGRAM(S): 50 INJECTION INTRAVENOUS at 13:09

## 2023-02-22 NOTE — ASU DISCHARGE PLAN (ADULT/PEDIATRIC) - ASU DC SPECIAL INSTRUCTIONSFT
- Please contact the office for any pain uncontrolled by medication, excessive bleeding or Fever>100.4  - Please call the office for a follow-up with your doctor in 2 weeks  - You can take ibuprofen 600mg every 6 hours and tylenol 975mg every 6 hours as needed for pain.  - Oxycodone should be used for severe pain only  - You may walk and climb stairs as often as youd like, no vigorous activity, do not lift anything greater than 10lbs, nothing per vagina x 6 weeks, do not drive while on pain medication.  - Stool softeners (like senna) and mild laxatives like miralax can help with bowel regularity. Constipation is a common complaint after surgery and straining should be avoided.

## 2023-02-22 NOTE — BRIEF OPERATIVE NOTE - NSICDXBRIEFPREOP_GEN_ALL_CORE_FT
PRE-OP DIAGNOSIS:  Morbid obesity, BMI not known 22-Feb-2023 12:24:21  Julien Castro  Fibroid uterus 22-Feb-2023 12:24:36  Julien Castro

## 2023-02-22 NOTE — BRIEF OPERATIVE NOTE - NSICDXBRIEFPOSTOP_GEN_ALL_CORE_FT
POST-OP DIAGNOSIS:  Morbid obesity, BMI not known 22-Feb-2023 12:24:51  Julien Castro  Fibroid uterus 22-Feb-2023 12:25:06  Julien Castro

## 2023-02-22 NOTE — ASU DISCHARGE PLAN (ADULT/PEDIATRIC) - CARE PROVIDER_API CALL
Julien Castro)  Obstetrics and Gynecology  [address] [place] [id number]  Phone: [phone]  Fax: [phone]  Established Patient  Follow Up Time: 2 weeks

## 2023-02-22 NOTE — ASU DISCHARGE PLAN (ADULT/PEDIATRIC) - NS MD DC FALL RISK RISK
For information on Fall & Injury Prevention, visit: [email or web address] OR  [email or web address] OR  https://www.cdc.gov/steadi/patient.html

## 2023-02-22 NOTE — BRIEF OPERATIVE NOTE - NSICDXBRIEFPROCEDURE_GEN_ALL_CORE_FT
PROCEDURES:  Myomectomy, robot-assisted, laparoscopic, 1-4 myomas 22-Feb-2023 12:23:25  Julien Castro L

## 2023-02-22 NOTE — ASU PATIENT PROFILE, ADULT - FALL HARM RISK - UNIVERSAL INTERVENTIONS
Bed in lowest position, wheels locked, appropriate side rails in place/Call bell, personal items and telephone in reach/Instruct patient to call for assistance before getting out of bed or chair/Non-slip footwear when patient is out of bed/[place] to call system/Physically safe environment - no spills, clutter or unnecessary equipment/Purposeful Proactive Rounding/Room/bathroom lighting operational, light cord in reach

## 2023-02-24 LAB — SURGICAL PATHOLOGY STUDY: SIGNIFICANT CHANGE UP

## 2023-02-27 DIAGNOSIS — D25.9 LEIOMYOMA OF UTERUS, UNSPECIFIED: ICD-10-CM

## 2023-02-27 DIAGNOSIS — D25.1 INTRAMURAL LEIOMYOMA OF UTERUS: ICD-10-CM

## 2023-02-27 DIAGNOSIS — E66.01 MORBID (SEVERE) OBESITY DUE TO EXCESS CALORIES: ICD-10-CM

## 2023-03-03 ENCOUNTER — TRANSCRIPTION ENCOUNTER (OUTPATIENT)
Age: 33
End: 2023-03-03

## 2023-03-06 LAB — SARS-COV-2 N GENE NPH QL NAA+PROBE: NOT DETECTED

## 2023-03-10 ENCOUNTER — APPOINTMENT (OUTPATIENT)
Dept: OBGYN | Facility: CLINIC | Age: 33
End: 2023-03-10
Payer: COMMERCIAL

## 2023-03-10 VITALS — BODY MASS INDEX: 41.48 KG/M2 | DIASTOLIC BLOOD PRESSURE: 74 MMHG | SYSTOLIC BLOOD PRESSURE: 128 MMHG | WEIGHT: 257 LBS

## 2023-03-10 DIAGNOSIS — Z48.89 ENCOUNTER FOR OTHER SPECIFIED SURGICAL AFTERCARE: ICD-10-CM

## 2023-03-10 DIAGNOSIS — Z30.011 ENCOUNTER FOR INITIAL PRESCRIPTION OF CONTRACEPTIVE PILLS: ICD-10-CM

## 2023-03-10 PROCEDURE — 99024 POSTOP FOLLOW-UP VISIT: CPT

## 2023-03-10 RX ORDER — DROSPIRENONE 4 MG/1
4 TABLET, FILM COATED ORAL
Qty: 1 | Refills: 2 | Status: ACTIVE | COMMUNITY
Start: 2023-03-10 | End: 1900-01-01

## 2023-03-10 NOTE — PLAN
[FreeTextEntry1] : PATIENT PRESENTS FOR POSTOP VISIT. DISCUSSED POSTOP RESTRICTIONS AND LIMITATIONS. ADVISED TO CALL WITH ANY UNEXPECTED BLEEDING OR PAIN. PATIENT TO FOLLOWUP IN 3 MONTHS TIME. DISCUSSED FOLLOWUP CARE AND RESTRICTIONS.

## 2023-03-13 PROBLEM — E66.9 OBESITY, UNSPECIFIED: Chronic | Status: ACTIVE | Noted: 2023-02-10

## 2023-03-13 PROBLEM — L40.9 PSORIASIS, UNSPECIFIED: Chronic | Status: ACTIVE | Noted: 2023-02-10

## 2023-03-13 PROBLEM — K21.9 GASTRO-ESOPHAGEAL REFLUX DISEASE WITHOUT ESOPHAGITIS: Chronic | Status: ACTIVE | Noted: 2023-02-10

## 2023-03-13 PROBLEM — D64.9 ANEMIA, UNSPECIFIED: Chronic | Status: ACTIVE | Noted: 2023-02-10

## 2023-03-20 ENCOUNTER — NON-APPOINTMENT (OUTPATIENT)
Age: 33
End: 2023-03-20

## 2023-06-09 ENCOUNTER — APPOINTMENT (OUTPATIENT)
Dept: OBGYN | Facility: CLINIC | Age: 33
End: 2023-06-09
Payer: COMMERCIAL

## 2023-06-09 VITALS
RESPIRATION RATE: 14 BRPM | HEIGHT: 66 IN | HEART RATE: 75 BPM | BODY MASS INDEX: 40.98 KG/M2 | DIASTOLIC BLOOD PRESSURE: 90 MMHG | WEIGHT: 255 LBS | SYSTOLIC BLOOD PRESSURE: 164 MMHG

## 2023-06-09 DIAGNOSIS — D25.0 SUBMUCOUS LEIOMYOMA OF UTERUS: ICD-10-CM

## 2023-06-09 PROCEDURE — 99214 OFFICE O/P EST MOD 30 MIN: CPT | Mod: 25

## 2023-06-09 PROCEDURE — 93976 VASCULAR STUDY: CPT

## 2023-06-09 PROCEDURE — 76830 TRANSVAGINAL US NON-OB: CPT

## 2023-06-26 PROBLEM — D25.0 SUBMUCOUS LEIOMYOMA OF UTERUS: Status: ACTIVE | Noted: 2022-11-11

## 2023-06-26 NOTE — PROCEDURE
[Fibroid Uterus] : fibroid uterus [Transvaginal Ultrasound] : transvaginal ultrasound [Color Doppler Imaging] : color doppler imaging [Anteverted] : anteverted [No Fibroid(s)] : no fibroid(s) [L: ___ cm] : L: [unfilled] cm [H: ___ cm] : H: [unfilled] cm [FreeTextEntry3] : NO MYOMETRIAL DEFECTS NOTED. NO FREE FLUID SEEN. NORMAL COLOR FLOW TO THE ADNEXA [FreeTextEntry7] : 3.10 X 2.09 [FreeTextEntry8] : 3.25 X 2.34 [FreeTextEntry4] : UNREMARKABLE GYN SONOGRAM. NO MYOMATA SEEN. FOLLOW UP AS CLINICALLY INDICATED.

## 2023-06-26 NOTE — PHYSICAL EXAM
[Appropriately responsive] : appropriately responsive [Soft] : soft [Non-tender] : non-tender [Non-distended] : non-distended [No HSM] : No HSM [FreeTextEntry7] : INCISIONS WELL HEALED [Labia Majora] : normal [Labia Minora] : normal [Normal] : normal [Uterine Adnexae] : normal

## 2023-06-26 NOTE — HISTORY OF PRESENT ILLNESS
[FreeTextEntry1] : PT PRESENTS FOR FOLLOW UP AFTER MYOMECTOMY. PT DENIES COMPLAINTS OF PAIN OR BLEEDING

## 2023-06-26 NOTE — PLAN
[FreeTextEntry1] : PT DOING WELL AT FOLLOW UP VISIT. DENIES COMPLAINTS. NO MYOMATA SEEN. FOLLOW UP FOR ROUTINE CARE

## 2024-04-30 ENCOUNTER — APPOINTMENT (OUTPATIENT)
Dept: OBGYN | Facility: CLINIC | Age: 34
End: 2024-04-30
Payer: COMMERCIAL

## 2024-04-30 VITALS
HEIGHT: 66 IN | WEIGHT: 254 LBS | DIASTOLIC BLOOD PRESSURE: 82 MMHG | BODY MASS INDEX: 40.82 KG/M2 | SYSTOLIC BLOOD PRESSURE: 128 MMHG

## 2024-04-30 DIAGNOSIS — Z00.00 ENCOUNTER FOR GENERAL ADULT MEDICAL EXAMINATION W/OUT ABNORMAL FINDINGS: ICD-10-CM

## 2024-04-30 PROCEDURE — 99395 PREV VISIT EST AGE 18-39: CPT

## 2024-04-30 RX ORDER — NORETHINDRONE ACETATE AND ETHINYL ESTRADIOL, ETHINYL ESTRADIOL AND FERROUS FUMARATE 1MG-10(24)
1 MG-10 MCG / KIT ORAL DAILY
Qty: 3 | Refills: 3 | Status: ACTIVE | COMMUNITY
Start: 2023-03-20 | End: 1900-01-01

## 2024-04-30 RX ORDER — NORETHINDRONE ACETATE 5 MG/1
5 TABLET ORAL
Qty: 20 | Refills: 0 | Status: ACTIVE | COMMUNITY
Start: 2022-11-11 | End: 1900-01-01

## 2024-05-01 LAB — HPV HIGH+LOW RISK DNA PNL CVX: NOT DETECTED

## 2024-05-01 NOTE — HISTORY OF PRESENT ILLNESS
[FreeTextEntry1] : 34 YO F PRESENTS FOR ANNUAL VISIT. PT FEELS WELL AND OFFERS NO COMPLAINTS. PT IS GETTING  IN 3 WEEKS AND WOULD LIKE TO AVOID HAVING HER PERIOD DURING HER [place]. PT IS CURRENTLY TAKING LO LOESTRIN AND IS HAPPY WITH IT. STATES SHE DOES NOT ALWAYS GET HER PERIOD DURING HER [place] WEEK. PT JUST HAD HER PERIOD ON 4/14/24, BLEEDING STOPPED AND THEN SHE STARTED HAVING DARK BROWN SPOTTING AGAIN- STILL CURRENTLY SPOTTING.   SHX: UTERINE MYOMECTOMY FEB 2023  LPAP 1/6/21: NORMAL

## 2024-05-01 NOTE — PLAN
[FreeTextEntry1] : 34YO F PRESENTS FOR ANNUAL EXAMINATION BREAST EXAM WITHIN NORMAL LIMITS AND SELF BREAST EXAM DISCUSSED VAGINAL EXAM WITHIN NORMAL LIMITS- PT IS STILL HAVING MODERATE AMOUNT OF DARK BROWN BLEEDING; PAP COLLECTED BIRTH CONTROL DISCUSSED- NORETHINDRONE 5MG BID X10 DAYS PRESCRIBED PRIOR TO NEXT MENSES TO AVOID BTB FOLLOW UP IN 1 YEAR OR PRN ALL QUESTIONS ANSWERED REVIEWED AND AGREE WITH CARE PLAN. TG

## 2024-05-02 LAB — CYTOLOGY CVX/VAG DOC THIN PREP: NORMAL

## 2025-04-14 ENCOUNTER — RX RENEWAL (OUTPATIENT)
Age: 35
End: 2025-04-14